# Patient Record
Sex: MALE | Race: WHITE | Employment: FULL TIME | URBAN - METROPOLITAN AREA
[De-identification: names, ages, dates, MRNs, and addresses within clinical notes are randomized per-mention and may not be internally consistent; named-entity substitution may affect disease eponyms.]

---

## 2020-11-12 ENCOUNTER — HOSPITAL ENCOUNTER (EMERGENCY)
Age: 45
Discharge: HOME | End: 2020-11-12
Payer: OTHER GOVERNMENT

## 2020-11-12 VITALS
HEART RATE: 92 BPM | OXYGEN SATURATION: 98 % | DIASTOLIC BLOOD PRESSURE: 84 MMHG | RESPIRATION RATE: 14 BRPM | TEMPERATURE: 97.8 F | SYSTOLIC BLOOD PRESSURE: 138 MMHG | HEIGHT: 67 IN | WEIGHT: 219.38 LBS | BODY MASS INDEX: 34.43 KG/M2

## 2020-11-12 PROCEDURE — 99283 EMERGENCY DEPT VISIT LOW MDM: CPT

## 2020-11-12 RX ORDER — TRAMADOL HYDROCHLORIDE 50 MG/1
50 TABLET ORAL EVERY 6 HOURS PRN
Qty: 20 TABLET | Refills: 0 | Status: SHIPPED | OUTPATIENT
Start: 2020-11-12 | End: 2020-11-17

## 2020-11-12 ASSESSMENT — PAIN SCALES - GENERAL: PAINLEVEL_OUTOF10: 9

## 2020-11-12 ASSESSMENT — PAIN DESCRIPTION - LOCATION: LOCATION: ARM

## 2020-11-12 ASSESSMENT — PAIN DESCRIPTION - PAIN TYPE: TYPE: ACUTE PAIN

## 2020-11-12 ASSESSMENT — PAIN DESCRIPTION - ORIENTATION: ORIENTATION: RIGHT

## 2020-11-12 ASSESSMENT — PAIN DESCRIPTION - DESCRIPTORS: DESCRIPTORS: ACHING

## 2020-11-12 ASSESSMENT — PAIN DESCRIPTION - FREQUENCY: FREQUENCY: CONTINUOUS

## 2020-11-12 NOTE — ED PROVIDER NOTES
[facility]     Department of Emergency Medicine   ED  Provider Note  Admit Date/RoomTime: 11/12/2020  1:13 PM  ED Room: [place] [id number]  Chief Complaint:   Arm Pain (states rt arm injury from lifting tjis am)    History of Present Illness   Source of history provided by:  patient. History/Exam Limitations: none. Bony Fields is a 41 y.o. old male who presents to the emergency department by private vehicle, for Right upper arm pain which occured just prior to arrival.  Cause of complaint: fell off the back of a u-haul trailer and hurt his distal bicep tendon when he caught himself to brace his fall while packing up some of his father's belongings. Previous injury: yes: on left side, states his symptoms are the same. The patients tetanus status is up to date. Since onset the symptoms have been moderate in degree. His pain is aggraveated by movement and relieved by holding his arm in flexion. ROS   Pertinent positives and negatives are stated within HPI, all other systems reviewed and are negative. History reviewed. No pertinent past medical history. History reviewed. No pertinent surgical history. Social History:  reports that he has never smoked. He has never used smokeless tobacco. He reports current alcohol use. He reports that he does not use drugs. Family History: family history is not on file. Allergies: Patient has no known allergies. Physical Exam         ED Triage Vitals   BP Temp Temp Source Pulse Resp SpO2 Height Weight   11/12/20 1309 11/12/20 1214 11/12/20 1214 11/12/20 1309 11/12/20 1309 11/12/20 1309 11/12/20 1309 11/12/20 1309   138/84 97.6 °F (36.4 °C) Tympanic 92 14 98 % 5' 7\" (1.702 m) 219 lb 6 oz (99.5 kg)      Oxygen Saturation Interpretation: Normal.    · Constitutional:  Alert, development consistent with age. · HEENT:  NC/NT. Airway patent. · Neck:  Normal ROM. Supple. · Extremity(s):  Right: distal bicep. Tenderness:  moderate. transcription errors may be present.   END OF ED PROVIDER NOTE        Grupo Garcia PA-C  11/12/20 1775